# Patient Record
Sex: MALE | Race: WHITE | NOT HISPANIC OR LATINO | URBAN - METROPOLITAN AREA
[De-identification: names, ages, dates, MRNs, and addresses within clinical notes are randomized per-mention and may not be internally consistent; named-entity substitution may affect disease eponyms.]

---

## 2024-01-05 PROBLEM — M1A.09X0 IDIOPATHIC CHRONIC GOUT OF MULTIPLE SITES WITHOUT TOPHUS: Status: ACTIVE | Noted: 2024-01-05

## 2024-01-05 PROBLEM — L25.5 CONTACT DERMATITIS DUE TO PLANT: Status: ACTIVE | Noted: 2024-01-05

## 2024-01-05 PROBLEM — M54.12 LEFT CERVICAL RADICULOPATHY: Status: ACTIVE | Noted: 2024-01-05

## 2024-01-05 PROBLEM — L23.7 ALLERGIC CONTACT DERMATITIS DUE TO PLANTS, EXCEPT FOOD: Status: ACTIVE | Noted: 2024-01-05

## 2024-01-05 PROBLEM — R73.9 HYPERGLYCEMIA: Status: ACTIVE | Noted: 2024-01-05

## 2024-01-05 PROBLEM — M10.062 ACUTE IDIOPATHIC GOUT OF LEFT KNEE: Status: ACTIVE | Noted: 2024-01-05

## 2024-01-05 PROBLEM — M79.89 SWELLING OF LEFT LOWER EXTREMITY: Status: ACTIVE | Noted: 2024-01-05

## 2024-01-05 PROBLEM — S46.912A LEFT SHOULDER STRAIN, INITIAL ENCOUNTER: Status: ACTIVE | Noted: 2024-01-05

## 2024-01-05 PROBLEM — R20.2 PARESTHESIA OF LEFT ARM: Status: ACTIVE | Noted: 2024-01-05

## 2024-01-05 PROBLEM — M25.562 CHRONIC PAIN OF LEFT KNEE: Status: ACTIVE | Noted: 2024-01-05

## 2024-01-05 PROBLEM — M25.512 CHRONIC LEFT SHOULDER PAIN: Status: ACTIVE | Noted: 2024-01-05

## 2024-01-05 PROBLEM — G89.29 CHRONIC LEFT SHOULDER PAIN: Status: ACTIVE | Noted: 2024-01-05

## 2024-01-05 PROBLEM — G89.29 CHRONIC PAIN OF LEFT KNEE: Status: ACTIVE | Noted: 2024-01-05

## 2024-01-05 PROBLEM — M54.2 NECK PAIN: Status: ACTIVE | Noted: 2024-01-05

## 2024-01-08 ENCOUNTER — OFFICE VISIT (OUTPATIENT)
Dept: PRIMARY CARE | Facility: CLINIC | Age: 44
End: 2024-01-08
Payer: COMMERCIAL

## 2024-01-08 VITALS
SYSTOLIC BLOOD PRESSURE: 116 MMHG | BODY MASS INDEX: 29.93 KG/M2 | WEIGHT: 190.7 LBS | HEART RATE: 78 BPM | HEIGHT: 67 IN | DIASTOLIC BLOOD PRESSURE: 70 MMHG

## 2024-01-08 DIAGNOSIS — Z23 IMMUNIZATION DUE: ICD-10-CM

## 2024-01-08 DIAGNOSIS — Z00.00 HEALTHCARE MAINTENANCE: ICD-10-CM

## 2024-01-08 DIAGNOSIS — Z71.6 TOBACCO ABUSE COUNSELING: ICD-10-CM

## 2024-01-08 DIAGNOSIS — M10.072 ACUTE IDIOPATHIC GOUT OF LEFT ANKLE: Primary | ICD-10-CM

## 2024-01-08 PROCEDURE — 99213 OFFICE O/P EST LOW 20 MIN: CPT | Performed by: INTERNAL MEDICINE

## 2024-01-08 PROCEDURE — 4004F PT TOBACCO SCREEN RCVD TLK: CPT | Performed by: INTERNAL MEDICINE

## 2024-01-08 PROCEDURE — 90471 IMMUNIZATION ADMIN: CPT | Performed by: INTERNAL MEDICINE

## 2024-01-08 PROCEDURE — 90715 TDAP VACCINE 7 YRS/> IM: CPT | Performed by: INTERNAL MEDICINE

## 2024-01-08 RX ORDER — NABUMETONE 750 MG/1
TABLET, FILM COATED ORAL 2 TIMES DAILY
COMMUNITY
Start: 2021-01-29

## 2024-01-08 RX ORDER — CHOLECALCIFEROL (VITAMIN D3) 125 MCG
CAPSULE ORAL
COMMUNITY
End: 2024-01-08 | Stop reason: WASHOUT

## 2024-01-08 RX ORDER — HYDROCORTISONE VALERATE CREAM 2 MG/G
1 CREAM TOPICAL 2 TIMES DAILY
COMMUNITY
Start: 2016-06-11

## 2024-01-08 RX ORDER — NAPROXEN 500 MG/1
500 TABLET ORAL 2 TIMES DAILY PRN
Qty: 60 TABLET | Refills: 1 | Status: SHIPPED | OUTPATIENT
Start: 2024-01-08 | End: 2024-04-07

## 2024-01-08 RX ORDER — GABAPENTIN 300 MG/1
1 CAPSULE ORAL NIGHTLY
COMMUNITY
Start: 2021-01-29

## 2024-01-08 RX ORDER — HYDROXYZINE HYDROCHLORIDE 25 MG/1
25 TABLET, FILM COATED ORAL EVERY 8 HOURS PRN
COMMUNITY
Start: 2016-07-16

## 2024-01-08 RX ORDER — METHYLPREDNISOLONE 4 MG/1
TABLET ORAL
COMMUNITY
Start: 2016-07-16

## 2024-01-08 ASSESSMENT — ENCOUNTER SYMPTOMS: JOINT SWELLING: 0

## 2024-01-08 NOTE — PROGRESS NOTES
"Subjective   Patient ID: Harrison Barfield is a 43 y.o. male who presents for Establish Care (Patient here to establish with Dr. Campos.  Patient has history of gout.).  Here to get established and for follow-up of gout flare.    First started to get gout flares around 5 to 6 years ago.  It used to be more frequent, but now happens around once a year.  Says he made lifestyle adjustments to achieve this such as cutting out red meat, stopped drinking beer, and estimates he lost around 90 pounds.  Now has noticed that it only comes on if he eats fried food.  Ate fried chicken for about 3 to 4 days straight before his most recent attack started last week. Used to have a med he was prescribed for this but had run out since this was last filled before Covid.  He is not sure what the medication is.  Says a friend at work gave him their medication and that he took it and the pain has since nearly resolved (but doesn't know what that medication is either).  Says his 1 joint along his left medial ankle became very swollen red and painful, but that this has now resolved.    Smokes 1 pack per day and has since he was 9 years old.  He is interested in quitting but does not know if he is ready to do that today.  Works as a , and is concerned because multiple colleagues of his smoke and now have lung cancer or have passed away from it.        Review of Systems   Musculoskeletal:  Negative for joint swelling.       /70   Pulse 78   Ht 1.702 m (5' 7\")   Wt 86.5 kg (190 lb 11.2 oz)   BMI 29.87 kg/m²   Objective   Physical Exam  Constitutional:       General: He is not in acute distress.     Appearance: He is not ill-appearing, toxic-appearing or diaphoretic.   HENT:      Head: Normocephalic and atraumatic.   Eyes:      Pupils: Pupils are equal, round, and reactive to light.   Cardiovascular:      Rate and Rhythm: Regular rhythm.      Heart sounds: No murmur heard.     No friction rub. No gallop.   Pulmonary:      " Effort: Pulmonary effort is normal. No respiratory distress.      Breath sounds: No stridor. No wheezing, rhonchi or rales.   Abdominal:      General: Abdomen is flat. Bowel sounds are normal. There is no distension.      Palpations: Abdomen is soft.      Tenderness: There is no abdominal tenderness. There is no guarding.   Musculoskeletal:         General: No swelling or tenderness.      Cervical back: No rigidity or tenderness.   Lymphadenopathy:      Cervical: No cervical adenopathy.   Neurological:      Mental Status: He is alert.         Assessment/Plan   Problem List Items Addressed This Visit             ICD-10-CM    Tobacco abuse counseling Z71.6     -Smokes 1 ppd and has since age of 9.  -Discussed treatment options.  -He is interested in quitting but not today.  Would like to revisit at next appointment.          Other Visit Diagnoses         Codes    Acute idiopathic gout of left ankle    -  Primary M10.072    Relevant Medications    naproxen (Naprosyn) 500 mg tablet    Immunization due     Z23    Relevant Orders    Tdap vaccine, age 7 years and older (Completed)    Healthcare maintenance     Z00.00        -TDAP vaccine given in clinic today.  -Counseled on vaccinization, particularly the benefits/risks of the flu vaccine. Pt declined this visit despite dicsussion. Is agreeable to TDAP, but says otherwise he would rather avoid vaccines if possible.   -Gout flare ultimately resolved now, but will order naproxen PRN for next flare up. Pt advised to start within 24hrs if this occurs again. To call if pain doesn't resolve with this tx. Discussed consideration for allopurinol, but pt declined since he stated all the lifestyle changes he made were to ultimately avoid medications.  -6 mo f/u       Luiz Campos MD 01/08/24 11:41 AM

## 2024-01-08 NOTE — ASSESSMENT & PLAN NOTE
-Smokes 1 ppd and has since age of 9.  -Discussed treatment options.  -He is interested in quitting but not today.  Would like to revisit at next appointment.

## 2024-07-08 ENCOUNTER — APPOINTMENT (OUTPATIENT)
Dept: PRIMARY CARE | Facility: CLINIC | Age: 44
End: 2024-07-08
Payer: COMMERCIAL